# Patient Record
Sex: FEMALE | Race: WHITE | Employment: FULL TIME | URBAN - NONMETROPOLITAN AREA
[De-identification: names, ages, dates, MRNs, and addresses within clinical notes are randomized per-mention and may not be internally consistent; named-entity substitution may affect disease eponyms.]

---

## 2022-02-20 ENCOUNTER — OFFICE VISIT (OUTPATIENT)
Dept: PRIMARY CARE CLINIC | Age: 37
End: 2022-02-20
Payer: COMMERCIAL

## 2022-02-20 VITALS
HEART RATE: 115 BPM | HEIGHT: 65 IN | SYSTOLIC BLOOD PRESSURE: 122 MMHG | TEMPERATURE: 99.2 F | OXYGEN SATURATION: 98 % | BODY MASS INDEX: 21.05 KG/M2 | RESPIRATION RATE: 18 BRPM | DIASTOLIC BLOOD PRESSURE: 82 MMHG | WEIGHT: 126.38 LBS

## 2022-02-20 DIAGNOSIS — K12.0 ORAL APHTHOUS ULCER: ICD-10-CM

## 2022-02-20 DIAGNOSIS — K13.79 ACUTE ORAL PAIN: ICD-10-CM

## 2022-02-20 DIAGNOSIS — B00.9 HERPES SIMPLEX VIRUS (HSV) INFECTION: Primary | ICD-10-CM

## 2022-02-20 PROBLEM — F41.9 ANXIETY: Status: ACTIVE | Noted: 2020-11-16

## 2022-02-20 PROCEDURE — 99203 OFFICE O/P NEW LOW 30 MIN: CPT | Performed by: NURSE PRACTITIONER

## 2022-02-20 RX ORDER — LIDOCAINE HYDROCHLORIDE 20 MG/ML
5 SOLUTION OROPHARYNGEAL
Qty: 100 ML | Refills: 0 | Status: SHIPPED | OUTPATIENT
Start: 2022-02-20 | End: 2022-02-23

## 2022-02-20 RX ORDER — NORETHINDRONE ACETATE AND ETHINYL ESTRADIOL 1MG-20(21)
1 KIT ORAL DAILY
COMMUNITY
Start: 2022-02-14

## 2022-02-20 RX ORDER — METRONIDAZOLE 500 MG/1
TABLET ORAL
COMMUNITY
Start: 2022-02-15

## 2022-02-20 RX ORDER — VALACYCLOVIR HYDROCHLORIDE 1 G/1
2000 TABLET, FILM COATED ORAL 2 TIMES DAILY
Qty: 4 TABLET | Refills: 0 | Status: SHIPPED | OUTPATIENT
Start: 2022-02-20 | End: 2022-02-21

## 2022-02-20 ASSESSMENT — ENCOUNTER SYMPTOMS
TROUBLE SWALLOWING: 1
NAUSEA: 1
VOMITING: 0
EYE DISCHARGE: 0
SORE THROAT: 1

## 2022-02-20 ASSESSMENT — PATIENT HEALTH QUESTIONNAIRE - PHQ9
SUM OF ALL RESPONSES TO PHQ QUESTIONS 1-9: 0
SUM OF ALL RESPONSES TO PHQ9 QUESTIONS 1 & 2: 0
2. FEELING DOWN, DEPRESSED OR HOPELESS: 0
1. LITTLE INTEREST OR PLEASURE IN DOING THINGS: 0

## 2022-02-20 NOTE — PROGRESS NOTES
[address] [facility] A department of [facility] 99  Phone: [phone]  Fax: [phone]      Shirin Smith is a 31 y.o. female who presents to the [facility] today for her medical conditions/complaints as noted below. Shirin Smith is c/o of Medication Reaction (started flagyl tuesday. nauseated. flushed. gums swollen, ulcers in mouth, unable to sleep, eat, drink)          HPI:     Started treatment for Trichomonas vaginitis with Flagyl 2/15/2022. Thought that she was having an allergic reaction to the Flagyl. Took a full 4 days worth and then stopped last Friday due to oral pain. She reports that her Vaginal symptoms are resolved. Was also recently oral birth control pills as well. Starting Wednesday she noticed a sore in her mouth. Thought that this was from eating chips and salsa. Difficulty sleeping, chills, symptoms worse at night after second dose of Flagyl. Some nausea at night, no vomiting. Gums are swollen and red. Tender. Pain in jaw area, lymph nodes feel swollen and she is having generalized body aches. Has taken ibuprofen and tylenol for pain and also salt water gargles with mild improvement. Rash  This is a new problem. The current episode started in the past 7 days (Wednesday). The problem has been gradually worsening since onset. The affected locations include the lips (inside mouth. [place] ). The rash is characterized by blistering and pain. Associated symptoms include fatigue, a fever (not measured) and a sore throat. Pertinent negatives include no vomiting. (Face feels flushed.  ) Past treatments include analgesics (ibuprofen and tylenol. ). The treatment provided mild relief. Her past medical history is significant for varicella (as a child. ). There is no history of allergies.        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     Skin cancer 2016        Allergies   Allergen Reactions    Seasonal        Wt Readings from Last 3 Encounters:   02/20/22 126 lb 6 oz (57.3 kg)   02/28/14 129 lb (58.5 kg)     BP Readings from Last 3 Encounters:   02/20/22 122/82   02/28/14 112/62      Temp Readings from Last 3 Encounters:   02/20/22 99.2 °F (37.3 °C) (Temporal)     Pulse Readings from Last 3 Encounters:   02/20/22 115     SpO2 Readings from Last 3 Encounters:   02/20/22 98%       Subjective:      Review of Systems   Constitutional: Positive for activity change, appetite change (due to oral pain), chills, diaphoresis, fatigue and fever (not measured). HENT: Positive for sore throat and trouble swallowing (due to pain). Eyes: Negative for discharge. Gastrointestinal: Positive for nausea. Negative for vomiting. Musculoskeletal: Negative for joint swelling. Skin: Positive for rash. Psychiatric/Behavioral: Positive for sleep disturbance. Objective:     Vitals:    02/20/22 1305   BP: 122/82   Site: Right Upper Arm   Position: Sitting   Cuff Size: Medium Adult   Pulse: 115   Resp: 18   Temp: 99.2 °F (37.3 °C)   TempSrc: Temporal   SpO2: 98%   Weight: 126 lb 6 oz (57.3 kg)   Height: 5' 5\" (1.651 m)     Body mass index is 21.03 kg/m². /82 (Site: Right Upper Arm, Position: Sitting, Cuff Size: Medium Adult)   Pulse 115   Temp 99.2 °F (37.3 °C) (Temporal)   Resp 18   Ht 5' 5\" (1.651 m)   Wt 126 lb 6 oz (57.3 kg)   LMP 01/28/2022 (Exact Date)   SpO2 98%   BMI 21.03 kg/m²   Physical Exam  Vitals and nursing note reviewed. Constitutional:       General: She is not in acute distress. Appearance: She is well-developed and well-groomed. She is ill-appearing. HENT:      Nose: Nose normal.      Mouth/Throat:      Lips: Lesions (internal) present. Mouth: Mucous membranes are moist. Oral lesions present. Dentition: Dental tenderness, gingival swelling (with erythema) and gum lesions present. Tongue: No lesions. Palate: Lesions present.       Pharynx: Uvula midline. Posterior oropharyngeal erythema present. No pharyngeal swelling. Tonsils: No tonsillar exudate or tonsillar abscesses. Comments: Multiple ulcers noted throughout mouth area and along gingival survace. Most noted on inner lower lip and upper lip. Several along gum line and pharynx. No lesions noted on tongue. Eyes:      Conjunctiva/sclera: Conjunctivae normal.      Pupils: Pupils are equal, round, and reactive to light. Neck:      Thyroid: No thyromegaly. Cardiovascular:      Rate and Rhythm: Normal rate and regular rhythm. Pulses: Normal pulses. Heart sounds: Normal heart sounds. No murmur heard. Pulmonary:      Effort: Pulmonary effort is normal. No respiratory distress. Breath sounds: Normal breath sounds. No wheezing or rales. Musculoskeletal:         General: Normal range of motion. Cervical back: Normal range of motion and neck supple. Lymphadenopathy:      Head:      Right side of head: Submandibular adenopathy present. Left side of head: Submandibular adenopathy present. Cervical: Cervical adenopathy present. Right cervical: Superficial cervical adenopathy present. No posterior cervical adenopathy. Left cervical: Superficial cervical adenopathy present. No posterior cervical adenopathy. Skin:     General: Skin is warm and dry. Capillary Refill: Capillary refill takes less than 2 seconds. Findings: Lesion (oral) present. No rash. Erythema: oral.   Neurological:      General: No focal deficit present. Mental Status: She is alert and oriented to person, place, and time. Mental status is at baseline. Psychiatric:         Mood and Affect: Mood normal.         Behavior: Behavior normal.         Judgment: Judgment normal.         Assessment:      Diagnosis Orders   1. Herpes simplex virus (HSV) infection  valACYclovir (VALTREX) 1 g tablet    lidocaine viscous hcl (XYLOCAINE) 2 % SOLN solution   2.  Oral aphthous ulcer  valACYclovir (VALTREX) 1 g tablet    lidocaine viscous hcl (XYLOCAINE) 2 % SOLN solution   3. Acute oral pain  lidocaine viscous hcl (XYLOCAINE) 2 % SOLN solution       Plan:   HSV infection. Will treat with Valtrex 2 gm po 2 times a day for one day. Also given rx for viscous lidocaine and instructed on to use it sparingly. Encouraged to have test of cure for Trichomonas vaginitis done in about 1 weeks especially since she stopped treatment a little early. Advised to go to ER if unable to take adequate PO intake and she becomes dehydrated. Discussed exam, POCT findings, plan of care (including prescriptive and supportive as listed below) and follow-up at length with patient. Reviewed all prescribed and recommended medications, administration and side effects. Encouraged to return to the clinic for no improvement and or worsening of symptoms. Patient instructed to go to ER or call 911 if any difficulty breathing, shortness of breath, inability to swallow, hives or temp greater than 103 degrees. All questions were answered and they verbalized understanding and were agreeable with the plan. Return if symptoms worsen or fail to improve.         Electronically signed by MARCIA Tinajero CNP on 2/20/2022 at 2:23 PM

## 2022-02-20 NOTE — PATIENT INSTRUCTIONS
Patient Education        Canker Sore: Care Instructions  Your Care Instructions  Canker sores are painful white sores in the mouth. They usually begin with a tingling feeling, followed by a red spot or bump that turns white. Canker sores appear most often on the tongue, inside the cheeks, and inside the lips. They can be very painful and can make talking, eating, and drinking difficult. A canker sore may form after an injury or stretching of tissues in the mouth, which can happen, for example, during a dental procedure or teeth cleaning. If you accidentally bite your tongue or the inside of your cheek, you may end up with a canker sore. Other possible causes are infection, certain foods, and stress. Canker sores are not contagious. The pain from your canker sore should decrease in 7 to 10 days, and it should heal completely in 1 to 3 weeks. In most cases, a canker sore will go away by itself. Home treatment can ease pain and discomfort. If you have a large or deep canker sore that does not seem to be getting better after 2 weeks, your doctor may prescribe medicine. Canker sores often come back again. Follow-up care is a key part of your treatment and safety. Be sure to make and go to all appointments, and call your doctor if you are having problems. It's also a good idea to know your test results and keep a list of the medicines you take. How can you care for yourself at home? · Drink cold liquids, such as water or iced tea, or eat flavored ice pops or frozen juices. Use a straw to keep the liquid from coming in contact with your canker sore. · Eat soft, bland foods that are easy to chew and swallow, such as ice cream, custard, applesauce, cottage cheese, macaroni and cheese, soft-cooked eggs, yogurt, or cream soups. · Cut foods into small pieces, or grind, mash, blend, or puree foods to make them easier to chew and swallow.   · While your canker sore heals, avoid coffee, chocolate, spicy and salty foods, citrus fruits, nuts, seeds, and tomatoes. · To soothe your canker sore and help it heal:  ? Use an over-the-counter numbing medicine, such as Orabase or Anbesol. ? Dab a bit of Milk of Magnesia on the canker sore 3 or 4 times a day. · Put ice on your sore to reduce the pain. · Take anti-inflammatory medicines to reduce pain, as needed. These include ibuprofen (Advil, Motrin) and naproxen (Aleve). Read and follow all instructions on the label. · Use a soft-bristle toothbrush, and brush your teeth well but carefully. · Do not smoke or use spit tobacco. Tobacco can cause mouth problems and slow healing. If you need help quitting, talk to your doctor about stop-smoking programs and medicines. These can increase your chances of quitting for good. When should you call for help? Call your doctor now or seek immediate medical care if:    · You have signs of infection, such as:  ? Increased pain, swelling, warmth, or redness. ? Red streaks leading from the area. ? Pus draining from the area. ? A fever. Watch closely for changes in your health, and be sure to contact your doctor if:    · You do not get better as expected. Where can you learn more? Go to https://[employer]. org and sign in to your [facility] account. Enter O330 in the [facility] box to learn more about \"Canker Sore: Care Instructions. \"     If you do not have an account, please click on the \"Sign Up Now\" link. Current as of: June 30, 2021               Content Version: 13.1  © 5433-2395 [employer], [employer]. Care instructions adapted under license by [facility] ([facility]). If you have questions about a medical condition or this instruction, always ask your healthcare professional. [place] [address] any warranty or liability for your use of this information.

## 2024-05-29 ENCOUNTER — OFFICE VISIT (OUTPATIENT)
Dept: PRIMARY CARE CLINIC | Age: 39
End: 2024-05-29
Payer: COMMERCIAL

## 2024-05-29 ENCOUNTER — HOSPITAL ENCOUNTER (OUTPATIENT)
Age: 39
Discharge: HOME | End: 2024-05-29
Payer: COMMERCIAL

## 2024-05-29 VITALS
RESPIRATION RATE: 14 BRPM | HEIGHT: 65 IN | WEIGHT: 138.13 LBS | OXYGEN SATURATION: 99 % | DIASTOLIC BLOOD PRESSURE: 80 MMHG | SYSTOLIC BLOOD PRESSURE: 120 MMHG | TEMPERATURE: 97.5 F | HEART RATE: 65 BPM | BODY MASS INDEX: 23.01 KG/M2

## 2024-05-29 DIAGNOSIS — R82.90 MALODOROUS URINE: ICD-10-CM

## 2024-05-29 DIAGNOSIS — R35.0 FREQUENCY OF URINATION: ICD-10-CM

## 2024-05-29 DIAGNOSIS — N39.0 URINARY TRACT INFECTION WITHOUT HEMATURIA, SITE UNSPECIFIED: Primary | ICD-10-CM

## 2024-05-29 DIAGNOSIS — N39.0 URINARY TRACT INFECTION WITHOUT HEMATURIA, SITE UNSPECIFIED: ICD-10-CM

## 2024-05-29 LAB
BACTERIA URNS QL MICRO: ABNORMAL
BILIRUB UR QL STRIP: NEGATIVE
CHARACTER UR: ABNORMAL
CLARITY UR: CLEAR
COLOR UR: YELLOW
EPI CELLS #/AREA URNS HPF: ABNORMAL /HPF (ref 0–5)
GLUCOSE UR STRIP-MCNC: NEGATIVE MG/DL
HGB UR QL STRIP.AUTO: NEGATIVE
KETONES UR STRIP-MCNC: NEGATIVE MG/DL
LEUKOCYTE ESTERASE UR QL STRIP: NEGATIVE
NITRITE UR QL STRIP: POSITIVE
PH UR STRIP: 8 [PH] (ref 5–6)
PROT UR STRIP-MCNC: NEGATIVE MG/DL
RBC #/AREA URNS HPF: ABNORMAL /HPF (ref 0–4)
SP GR UR STRIP: 1.01 (ref 1.01–1.02)
UROBILINOGEN UR STRIP-ACNC: NORMAL EU/DL (ref 0–1)
WBC #/AREA URNS HPF: ABNORMAL /HPF (ref 0–4)

## 2024-05-29 PROCEDURE — 87088 URINE BACTERIA CULTURE: CPT

## 2024-05-29 PROCEDURE — 81001 URINALYSIS AUTO W/SCOPE: CPT

## 2024-05-29 PROCEDURE — 87491 CHLMYD TRACH DNA AMP PROBE: CPT

## 2024-05-29 PROCEDURE — 87186 SC STD MICRODIL/AGAR DIL: CPT

## 2024-05-29 PROCEDURE — 87591 N.GONORRHOEAE DNA AMP PROB: CPT

## 2024-05-29 PROCEDURE — 99204 OFFICE O/P NEW MOD 45 MIN: CPT | Performed by: FAMILY MEDICINE

## 2024-05-29 PROCEDURE — 87086 URINE CULTURE/COLONY COUNT: CPT

## 2024-05-29 RX ORDER — DROSPIRENONE AND ETHINYL ESTRADIOL 0.02-3(28)
1 KIT ORAL DAILY
COMMUNITY
Start: 2024-01-30

## 2024-05-29 RX ORDER — CEPHALEXIN 500 MG/1
500 CAPSULE ORAL 3 TIMES DAILY
Qty: 21 CAPSULE | Refills: 0 | Status: SHIPPED | OUTPATIENT
Start: 2024-05-29

## 2024-05-29 ASSESSMENT — ENCOUNTER SYMPTOMS
CONSTIPATION: 0
DIARRHEA: 0
NAUSEA: 0
ABDOMINAL PAIN: 0
VOMITING: 0
BACK PAIN: 0

## 2024-05-29 NOTE — PROGRESS NOTES
2024     Ruba Helms (:  1985) is a 39 y.o. female, here for evaluation of the following medical concerns:    Urinary Pain   This is a new problem. The current episode started 1 to 4 weeks ago (urine has a unsual odor for the last 4 weeks or so). The problem occurs every urination. The problem has been unchanged. Quality: not really having significant pain. There has been no fever. Associated symptoms include frequency. Pertinent negatives include no chills, discharge, flank pain, nausea, urgency or vomiting. Associated symptoms comments: Does note that she has had intercourse with a new partner recently.  Not having vaginal discharge or irritation.  . Treatments tried: thought maybe it was some supplements she was taking so she stopped them but no change in urine odor.     Did review patient's med list, allergies, social history,pmhx and pshx today as noted in the record.      Review of Systems   Constitutional:  Negative for chills, fatigue and fever.   Gastrointestinal:  Negative for abdominal pain, constipation, diarrhea, nausea and vomiting.   Genitourinary:  Positive for frequency. Negative for dysuria, flank pain and urgency.   Musculoskeletal:  Negative for back pain and myalgias.       Prior to Visit Medications    Medication Sig Taking? Authorizing Provider   drospirenone-ethinyl estradiol (DONAVAN) 3-0.02 MG per tablet Take 1 tablet by mouth daily Yes Brandon Aleman MD   PRENATAL VITAMINS PO Take  by mouth. Indications: Pregnancy  Patient not taking: Reported on 2024  Brandon Aleman MD        Social History     Tobacco Use    Smoking status: Never    Smokeless tobacco: Never   Substance Use Topics    Alcohol use: Yes     Comment: rarely        Vitals:    24 1539   BP: 120/80   Site: Left Upper Arm   Position: Sitting   Cuff Size: Medium Adult   Pulse: 65   Resp: 14   Temp: 97.5 °F (36.4 °C)   TempSrc: Tympanic   SpO2: 99%   Weight: 62.7 kg (138 lb 2 oz)   Height:

## 2024-05-30 LAB
CHLAMYDIA DNA UR QL NAA+PROBE: NEGATIVE
N GONORRHOEA DNA UR QL NAA+PROBE: NEGATIVE
SPECIMEN DESCRIPTION: NORMAL

## 2024-05-31 LAB
MICROORGANISM SPEC CULT: ABNORMAL
SERVICE CMNT-IMP: ABNORMAL
SPECIMEN DESCRIPTION: ABNORMAL

## 2025-06-30 ENCOUNTER — HOSPITAL ENCOUNTER (OUTPATIENT)
Age: 40
Discharge: HOME | End: 2025-06-30
Payer: COMMERCIAL

## 2025-06-30 ENCOUNTER — OFFICE VISIT (OUTPATIENT)
Dept: PRIMARY CARE CLINIC | Age: 40
End: 2025-06-30
Payer: COMMERCIAL

## 2025-06-30 ENCOUNTER — RESULTS FOLLOW-UP (OUTPATIENT)
Dept: PRIMARY CARE CLINIC | Age: 40
End: 2025-06-30

## 2025-06-30 VITALS
TEMPERATURE: 98.7 F | BODY MASS INDEX: 22.99 KG/M2 | HEART RATE: 83 BPM | DIASTOLIC BLOOD PRESSURE: 78 MMHG | OXYGEN SATURATION: 94 % | RESPIRATION RATE: 16 BRPM | HEIGHT: 65 IN | SYSTOLIC BLOOD PRESSURE: 110 MMHG | WEIGHT: 138 LBS

## 2025-06-30 DIAGNOSIS — N30.01 ACUTE CYSTITIS WITH HEMATURIA: ICD-10-CM

## 2025-06-30 DIAGNOSIS — R35.0 FREQUENT URINATION: ICD-10-CM

## 2025-06-30 DIAGNOSIS — N30.01 ACUTE CYSTITIS WITH HEMATURIA: Primary | ICD-10-CM

## 2025-06-30 LAB
BACTERIA URNS QL MICRO: ABNORMAL
BILIRUB UR QL STRIP: NEGATIVE
CHARACTER UR: ABNORMAL
CLARITY UR: CLEAR
COLOR UR: YELLOW
EPI CELLS #/AREA URNS HPF: ABNORMAL /HPF (ref 0–5)
GLUCOSE UR STRIP-MCNC: NEGATIVE MG/DL
HGB UR QL STRIP.AUTO: ABNORMAL
KETONES UR STRIP-MCNC: NEGATIVE MG/DL
LEUKOCYTE ESTERASE UR QL STRIP: ABNORMAL
NITRITE UR QL STRIP: NEGATIVE
PH UR STRIP: 7.5 [PH] (ref 5–6)
PROT UR STRIP-MCNC: NEGATIVE MG/DL
RBC #/AREA URNS HPF: ABNORMAL /HPF (ref 0–4)
SP GR UR STRIP: 1 (ref 1.01–1.02)
UROBILINOGEN UR STRIP-ACNC: NORMAL EU/DL (ref 0–1)
WBC #/AREA URNS HPF: ABNORMAL /HPF (ref 0–4)

## 2025-06-30 PROCEDURE — 81001 URINALYSIS AUTO W/SCOPE: CPT

## 2025-06-30 PROCEDURE — 86403 PARTICLE AGGLUT ANTBDY SCRN: CPT

## 2025-06-30 PROCEDURE — 99214 OFFICE O/P EST MOD 30 MIN: CPT | Performed by: PHYSICIAN ASSISTANT

## 2025-06-30 PROCEDURE — 87086 URINE CULTURE/COLONY COUNT: CPT

## 2025-06-30 RX ORDER — CEPHALEXIN 500 MG/1
500 CAPSULE ORAL 2 TIMES DAILY
Qty: 14 CAPSULE | Refills: 0 | Status: SHIPPED | OUTPATIENT
Start: 2025-06-30 | End: 2025-07-07

## 2025-06-30 SDOH — ECONOMIC STABILITY: FOOD INSECURITY: WITHIN THE PAST 12 MONTHS, THE FOOD YOU BOUGHT JUST DIDN'T LAST AND YOU DIDN'T HAVE MONEY TO GET MORE.: NEVER TRUE

## 2025-06-30 SDOH — ECONOMIC STABILITY: FOOD INSECURITY: WITHIN THE PAST 12 MONTHS, YOU WORRIED THAT YOUR FOOD WOULD RUN OUT BEFORE YOU GOT MONEY TO BUY MORE.: NEVER TRUE

## 2025-06-30 ASSESSMENT — ENCOUNTER SYMPTOMS
NAUSEA: 0
SHORTNESS OF BREATH: 0
VOMITING: 0

## 2025-06-30 ASSESSMENT — PATIENT HEALTH QUESTIONNAIRE - PHQ9
SUM OF ALL RESPONSES TO PHQ QUESTIONS 1-9: 0
2. FEELING DOWN, DEPRESSED OR HOPELESS: NOT AT ALL
SUM OF ALL RESPONSES TO PHQ QUESTIONS 1-9: 0
1. LITTLE INTEREST OR PLEASURE IN DOING THINGS: NOT AT ALL
SUM OF ALL RESPONSES TO PHQ QUESTIONS 1-9: 0
SUM OF ALL RESPONSES TO PHQ QUESTIONS 1-9: 0

## 2025-06-30 NOTE — PROGRESS NOTES
tylenol and/or NSAIDs for fever and pain relief . Patient agrees with plan of care. Advised patient to follow up with PCP or walk in if symptoms persist or worsen.           Plan:   Assessment & Plan   Return if symptoms worsen or fail to improve.     Orders Placed This Encounter   Procedures    Culture, Urine     Standing Status:   Future     Number of Occurrences:   1     Expected Date:   6/30/2025     Expiration Date:   7/30/2025     Specify (ex-cath, midstream, cysto, etc)?:   midstream    Urinalysis with Reflex to Culture     Standing Status:   Future     Number of Occurrences:   1     Expected Date:   6/30/2025     Expiration Date:   6/30/2026     SPECIFY(EX-CATH,MIDSTREAM,CYSTO,ETC)?:   MID     Orders Placed This Encounter   Medications    cephALEXin (KEFLEX) 500 MG capsule     Sig: Take 1 capsule by mouth 2 times daily for 7 days     Dispense:  14 capsule     Refill:  0        Patient given educational materials - see patient instructions. Discussed use, benefits, and side effects of prescribed medications with patient. All patient questions answered and patient verbalized understanding. Instructed to continue current medications, diet and exercise. Patient agreed with the treatment plan. Encouraged patient to follow up with PCP or return to the clinic for no improvement and or worsening of symptoms.    Electronically signed by Ivanna Parrish PA-C on 6/30/2025 at 6:59 PM

## 2025-07-02 LAB
MICROORGANISM SPEC CULT: ABNORMAL
SERVICE CMNT-IMP: ABNORMAL
SPECIMEN DESCRIPTION: ABNORMAL